# Patient Record
Sex: FEMALE | Race: WHITE | Employment: FULL TIME
[De-identification: names, ages, dates, MRNs, and addresses within clinical notes are randomized per-mention and may not be internally consistent; named-entity substitution may affect disease eponyms.]

---

## 2017-01-04 ENCOUNTER — OFFICE VISIT (OUTPATIENT)
Dept: BARIATRICS/WEIGHT MGMT | Facility: CLINIC | Age: 42
End: 2017-01-04

## 2017-01-04 VITALS
WEIGHT: 232 LBS | BODY MASS INDEX: 36.41 KG/M2 | HEIGHT: 67 IN | SYSTOLIC BLOOD PRESSURE: 110 MMHG | DIASTOLIC BLOOD PRESSURE: 78 MMHG | HEART RATE: 78 BPM

## 2017-01-04 DIAGNOSIS — E66.01 MORBID OBESITY, UNSPECIFIED OBESITY TYPE (HCC): ICD-10-CM

## 2017-01-04 DIAGNOSIS — Z98.84 S/P LAPAROSCOPIC SLEEVE GASTRECTOMY: ICD-10-CM

## 2017-01-04 DIAGNOSIS — E55.9 VITAMIN D DEFICIENCY: ICD-10-CM

## 2017-01-04 DIAGNOSIS — E78.2 MIXED HYPERLIPIDEMIA: ICD-10-CM

## 2017-01-04 DIAGNOSIS — R10.11 RUQ PAIN: Primary | ICD-10-CM

## 2017-01-04 PROCEDURE — 99213 OFFICE O/P EST LOW 20 MIN: CPT | Performed by: SURGERY

## 2017-01-04 RX ORDER — OMEPRAZOLE 40 MG/1
40 CAPSULE, DELAYED RELEASE ORAL DAILY
Qty: 30 CAPSULE | Refills: 3 | Status: SHIPPED | OUTPATIENT
Start: 2017-01-04

## 2017-01-10 ENCOUNTER — NURSE ONLY (OUTPATIENT)
Dept: BARIATRICS/WEIGHT MGMT | Facility: CLINIC | Age: 42
End: 2017-01-10

## 2017-01-10 VITALS — BODY MASS INDEX: 36.49 KG/M2 | WEIGHT: 233 LBS

## 2017-01-10 DIAGNOSIS — Z98.84 S/P LAPAROSCOPIC SLEEVE GASTRECTOMY: Primary | ICD-10-CM

## 2017-03-27 ENCOUNTER — TELEPHONE (OUTPATIENT)
Dept: BARIATRICS/WEIGHT MGMT | Age: 42
End: 2017-03-27

## 2017-04-12 ENCOUNTER — EMPLOYEE WELLNESS (OUTPATIENT)
Dept: OTHER | Age: 42
End: 2017-04-12

## 2017-04-12 LAB
CHOLESTEROL/HDL RATIO: 4.8
CHOLESTEROL: 221 MG/DL
GLUCOSE BLD-MCNC: 92 MG/DL (ref 70–99)
HDLC SERPL-MCNC: 46 MG/DL
LDL CHOLESTEROL: 151 MG/DL (ref 0–130)
PATIENT FASTING?: YES
TRIGL SERPL-MCNC: 118 MG/DL
VLDLC SERPL CALC-MCNC: ABNORMAL MG/DL (ref 1–30)

## 2017-06-27 ENCOUNTER — HOSPITAL ENCOUNTER (OUTPATIENT)
Age: 42
Setting detail: SPECIMEN
Discharge: HOME | End: 2017-06-27
Payer: COMMERCIAL

## 2017-06-27 ENCOUNTER — OFFICE VISIT (OUTPATIENT)
Dept: OBGYN CLINIC | Age: 42
End: 2017-06-27
Payer: COMMERCIAL

## 2017-06-27 VITALS
HEIGHT: 67 IN | BODY MASS INDEX: 36.1 KG/M2 | WEIGHT: 230 LBS | HEART RATE: 70 BPM | RESPIRATION RATE: 16 BRPM | DIASTOLIC BLOOD PRESSURE: 80 MMHG | OXYGEN SATURATION: 100 % | SYSTOLIC BLOOD PRESSURE: 123 MMHG

## 2017-06-27 DIAGNOSIS — N64.4 MASTODYNIA OF LEFT BREAST: ICD-10-CM

## 2017-06-27 DIAGNOSIS — Z01.419 ENCOUNTER FOR ROUTINE GYNECOLOGICAL EXAMINATION WITH PAPANICOLAOU SMEAR OF CERVIX: Primary | ICD-10-CM

## 2017-06-27 DIAGNOSIS — N64.4 BREAST PAIN: ICD-10-CM

## 2017-06-27 PROCEDURE — 99395 PREV VISIT EST AGE 18-39: CPT | Performed by: OBSTETRICS & GYNECOLOGY

## 2017-06-29 PROBLEM — N64.4 MASTODYNIA OF LEFT BREAST: Status: ACTIVE | Noted: 2017-06-29

## 2017-06-29 LAB — CYTOLOGY REPORT: NORMAL

## 2017-07-17 ENCOUNTER — HOSPITAL ENCOUNTER (OUTPATIENT)
Dept: ULTRASOUND IMAGING | Age: 42
Discharge: HOME | End: 2017-07-17
Payer: COMMERCIAL

## 2017-07-17 ENCOUNTER — HOSPITAL ENCOUNTER (OUTPATIENT)
Dept: MAMMOGRAPHY | Age: 42
Discharge: HOME | End: 2017-07-17
Payer: COMMERCIAL

## 2017-07-17 ENCOUNTER — HOSPITAL ENCOUNTER (OUTPATIENT)
Age: 42
Discharge: HOME | End: 2017-07-17
Payer: COMMERCIAL

## 2017-07-17 DIAGNOSIS — N64.4 BREAST PAIN, LEFT: Primary | ICD-10-CM

## 2017-07-17 DIAGNOSIS — N64.4 BREAST PAIN: ICD-10-CM

## 2017-07-17 DIAGNOSIS — N64.4 PAIN OF LEFT BREAST: ICD-10-CM

## 2017-07-17 DIAGNOSIS — N64.4 BREAST PAIN, LEFT: ICD-10-CM

## 2017-07-17 LAB
ABSOLUTE EOS #: 0.2 K/UL (ref 0–0.4)
ABSOLUTE LYMPH #: 1.7 K/UL (ref 1–4.8)
ABSOLUTE MONO #: 0.4 K/UL (ref 0.2–0.8)
ALBUMIN SERPL-MCNC: 4.3 G/DL (ref 3.5–5.2)
ALBUMIN/GLOBULIN RATIO: ABNORMAL (ref 1–2.5)
ALP BLD-CCNC: 50 U/L (ref 35–104)
ALT SERPL-CCNC: 29 U/L (ref 5–33)
ANION GAP SERPL CALCULATED.3IONS-SCNC: 10 MMOL/L (ref 9–17)
AST SERPL-CCNC: 22 U/L
BASOPHILS # BLD: 0 %
BASOPHILS ABSOLUTE: 0 K/UL (ref 0–0.2)
BILIRUB SERPL-MCNC: 0.38 MG/DL (ref 0.3–1.2)
BILIRUBIN URINE: NEGATIVE
BUN BLDV-MCNC: 12 MG/DL (ref 6–20)
BUN/CREAT BLD: 21 (ref 9–20)
C-REACTIVE PROTEIN: 4.2 MG/L (ref 0–5)
CALCIUM SERPL-MCNC: 9 MG/DL (ref 8.6–10.4)
CHLORIDE BLD-SCNC: 103 MMOL/L (ref 98–107)
CHOLESTEROL/HDL RATIO: 5
CHOLESTEROL: 229 MG/DL
CO2: 27 MMOL/L (ref 20–31)
COLOR: YELLOW
COMMENT UA: NORMAL
CORTISOL COLLECTION INFO: NORMAL
CORTISOL: 4.2 UG/DL
CREAT SERPL-MCNC: 0.57 MG/DL (ref 0.5–0.9)
DIFFERENTIAL TYPE: ABNORMAL
EOSINOPHILS RELATIVE PERCENT: 3 %
GFR AFRICAN AMERICAN: >60 ML/MIN
GFR NON-AFRICAN AMERICAN: >60 ML/MIN
GFR SERPL CREATININE-BSD FRML MDRD: ABNORMAL ML/MIN/{1.73_M2}
GFR SERPL CREATININE-BSD FRML MDRD: ABNORMAL ML/MIN/{1.73_M2}
GLUCOSE BLD-MCNC: 90 MG/DL (ref 70–99)
GLUCOSE URINE: NEGATIVE
HCT VFR BLD CALC: 38.4 % (ref 36–46)
HDLC SERPL-MCNC: 46 MG/DL
HEMOGLOBIN: 12.8 G/DL (ref 12–16)
KETONES, URINE: NEGATIVE
LDL CHOLESTEROL: 140 MG/DL (ref 0–130)
LEUKOCYTE ESTERASE, URINE: NEGATIVE
LYMPHOCYTES # BLD: 23 %
MCH RBC QN AUTO: 29 PG (ref 26–34)
MCHC RBC AUTO-ENTMCNC: 33.4 G/DL (ref 31–37)
MCV RBC AUTO: 86.8 FL (ref 80–100)
MONOCYTES # BLD: 5 %
NITRITE, URINE: NEGATIVE
PDW BLD-RTO: 14.7 % (ref 11.5–14.5)
PH UA: 6 (ref 5–8)
PLATELET # BLD: 237 K/UL (ref 130–400)
PLATELET ESTIMATE: ABNORMAL
PMV BLD AUTO: 9.8 FL (ref 6–12)
POTASSIUM SERPL-SCNC: 4.1 MMOL/L (ref 3.7–5.3)
PROTEIN UA: NEGATIVE
RBC # BLD: 4.42 M/UL (ref 4–5.2)
RBC # BLD: ABNORMAL 10*6/UL
RHEUMATOID FACTOR: <10 IU/ML
SEDIMENTATION RATE, ERYTHROCYTE: 10 MM (ref 0–20)
SEG NEUTROPHILS: 69 %
SEGMENTED NEUTROPHILS ABSOLUTE COUNT: 5 K/UL (ref 1.8–7.7)
SODIUM BLD-SCNC: 140 MMOL/L (ref 135–144)
SPECIFIC GRAVITY UA: 1.01 (ref 1–1.03)
THYROXINE, FREE: 0.91 NG/DL (ref 0.93–1.7)
TOTAL PROTEIN: 7.2 G/DL (ref 6.4–8.3)
TRIGL SERPL-MCNC: 213 MG/DL
TSH SERPL DL<=0.05 MIU/L-ACNC: 2.1 MIU/L (ref 0.3–5)
TURBIDITY: CLEAR
URIC ACID: 4.2 MG/DL (ref 2.4–5.7)
URINE HGB: NEGATIVE
UROBILINOGEN, URINE: NORMAL
VLDLC SERPL CALC-MCNC: ABNORMAL MG/DL (ref 1–30)
WBC # BLD: 7.4 K/UL (ref 3.5–11)
WBC # BLD: ABNORMAL 10*3/UL

## 2017-07-17 PROCEDURE — 82533 TOTAL CORTISOL: CPT

## 2017-07-17 PROCEDURE — 81003 URINALYSIS AUTO W/O SCOPE: CPT

## 2017-07-17 PROCEDURE — 36415 COLL VENOUS BLD VENIPUNCTURE: CPT

## 2017-07-17 PROCEDURE — 76642 ULTRASOUND BREAST LIMITED: CPT

## 2017-07-17 PROCEDURE — 84443 ASSAY THYROID STIM HORMONE: CPT

## 2017-07-17 PROCEDURE — 84439 ASSAY OF FREE THYROXINE: CPT

## 2017-07-17 PROCEDURE — 85025 COMPLETE CBC W/AUTO DIFF WBC: CPT

## 2017-07-17 PROCEDURE — 86431 RHEUMATOID FACTOR QUANT: CPT

## 2017-07-17 PROCEDURE — 85651 RBC SED RATE NONAUTOMATED: CPT

## 2017-07-17 PROCEDURE — 86038 ANTINUCLEAR ANTIBODIES: CPT

## 2017-07-17 PROCEDURE — 80053 COMPREHEN METABOLIC PANEL: CPT

## 2017-07-17 PROCEDURE — 84550 ASSAY OF BLOOD/URIC ACID: CPT

## 2017-07-17 PROCEDURE — G0279 TOMOSYNTHESIS, MAMMO: HCPCS

## 2017-07-17 PROCEDURE — 86140 C-REACTIVE PROTEIN: CPT

## 2017-07-17 PROCEDURE — 80061 LIPID PANEL: CPT

## 2017-07-18 LAB — ANTI-NUCLEAR ANTIBODY (ANA): NEGATIVE

## 2018-03-20 VITALS — BODY MASS INDEX: 36.02 KG/M2 | WEIGHT: 230 LBS

## 2018-05-08 ENCOUNTER — TELEPHONE (OUTPATIENT)
Dept: BARIATRICS/WEIGHT MGMT | Age: 43
End: 2018-05-08

## 2018-06-08 ENCOUNTER — HOSPITAL ENCOUNTER (OUTPATIENT)
Age: 43
Discharge: HOME | End: 2018-06-08
Payer: COMMERCIAL

## 2018-06-08 ENCOUNTER — HOSPITAL ENCOUNTER (OUTPATIENT)
Dept: MRI IMAGING | Age: 43
Discharge: HOME | End: 2018-06-10
Payer: COMMERCIAL

## 2018-06-08 DIAGNOSIS — R40.4 TRANSIENT ALTERATION OF AWARENESS: ICD-10-CM

## 2018-06-08 LAB
FOLATE: 14.3 NG/ML
VITAMIN D 25-HYDROXY: 18.1 NG/ML (ref 30–100)

## 2018-06-08 PROCEDURE — 36415 COLL VENOUS BLD VENIPUNCTURE: CPT

## 2018-06-08 PROCEDURE — 82746 ASSAY OF FOLIC ACID SERUM: CPT

## 2018-06-08 PROCEDURE — 82306 VITAMIN D 25 HYDROXY: CPT

## 2018-06-11 ENCOUNTER — HOSPITAL ENCOUNTER (OUTPATIENT)
Dept: MRI IMAGING | Age: 43
Discharge: HOME | End: 2018-06-13
Payer: COMMERCIAL

## 2018-06-11 DIAGNOSIS — R40.4 TRANSIENT ALTERATION OF AWARENESS: ICD-10-CM

## 2018-06-11 PROCEDURE — 6360000004 HC RX CONTRAST MEDICATION: Performed by: PHYSICIAN ASSISTANT

## 2018-06-11 PROCEDURE — A9579 GAD-BASE MR CONTRAST NOS,1ML: HCPCS | Performed by: PHYSICIAN ASSISTANT

## 2018-06-11 PROCEDURE — 70553 MRI BRAIN STEM W/O & W/DYE: CPT

## 2018-06-11 RX ADMIN — GADOTERIDOL 20 ML: 279.3 INJECTION, SOLUTION INTRAVENOUS at 09:55

## 2019-01-28 ENCOUNTER — HOSPITAL ENCOUNTER (OUTPATIENT)
Age: 44
Setting detail: SPECIMEN
Discharge: HOME | End: 2019-01-28
Payer: COMMERCIAL

## 2019-01-28 ENCOUNTER — OFFICE VISIT (OUTPATIENT)
Dept: OBGYN CLINIC | Age: 44
End: 2019-01-28
Payer: COMMERCIAL

## 2019-01-28 VITALS
DIASTOLIC BLOOD PRESSURE: 81 MMHG | HEIGHT: 67 IN | WEIGHT: 236.6 LBS | BODY MASS INDEX: 37.13 KG/M2 | SYSTOLIC BLOOD PRESSURE: 123 MMHG | HEART RATE: 70 BPM

## 2019-01-28 DIAGNOSIS — Z12.39 BREAST CANCER SCREENING: ICD-10-CM

## 2019-01-28 DIAGNOSIS — Z01.419 WELL WOMAN EXAM WITH ROUTINE GYNECOLOGICAL EXAM: Primary | ICD-10-CM

## 2019-01-28 DIAGNOSIS — N93.9 ABNORMAL UTERINE BLEEDING (AUB): ICD-10-CM

## 2019-01-28 PROCEDURE — G8484 FLU IMMUNIZE NO ADMIN: HCPCS | Performed by: OBSTETRICS & GYNECOLOGY

## 2019-01-28 PROCEDURE — 99396 PREV VISIT EST AGE 40-64: CPT | Performed by: OBSTETRICS & GYNECOLOGY

## 2019-01-28 RX ORDER — MULTIVIT-MIN/IRON/FOLIC ACID/K 18-600-40
CAPSULE ORAL
COMMUNITY

## 2019-01-28 RX ORDER — METHYLPHENIDATE HYDROCHLORIDE 54 MG/1
54 TABLET ORAL EVERY MORNING
COMMUNITY

## 2019-02-11 LAB — CYTOLOGY REPORT: NORMAL

## 2019-05-22 ENCOUNTER — TELEPHONE (OUTPATIENT)
Dept: BARIATRICS/WEIGHT MGMT | Age: 44
End: 2019-05-22

## 2019-05-22 NOTE — TELEPHONE ENCOUNTER
Patient due for annual post op visit. Spoke with patient she was at work . Stated she will call us back to schedule follow up .

## 2020-03-09 ENCOUNTER — TELEPHONE (OUTPATIENT)
Dept: BARIATRICS/WEIGHT MGMT | Age: 45
End: 2020-03-09

## 2020-10-19 ENCOUNTER — HOSPITAL ENCOUNTER (EMERGENCY)
Age: 45
Discharge: HOME | End: 2020-10-19
Attending: EMERGENCY MEDICINE
Payer: COMMERCIAL

## 2020-10-19 ENCOUNTER — APPOINTMENT (OUTPATIENT)
Dept: CT IMAGING | Age: 45
End: 2020-10-19
Payer: COMMERCIAL

## 2020-10-19 ENCOUNTER — APPOINTMENT (OUTPATIENT)
Dept: GENERAL RADIOLOGY | Age: 45
End: 2020-10-19
Payer: COMMERCIAL

## 2020-10-19 VITALS
RESPIRATION RATE: 16 BRPM | OXYGEN SATURATION: 100 % | TEMPERATURE: 99.5 F | BODY MASS INDEX: 31.39 KG/M2 | WEIGHT: 200 LBS | DIASTOLIC BLOOD PRESSURE: 95 MMHG | SYSTOLIC BLOOD PRESSURE: 137 MMHG | HEART RATE: 68 BPM | HEIGHT: 67 IN

## 2020-10-19 LAB
ABSOLUTE EOS #: 0.11 K/UL (ref 0–0.44)
ABSOLUTE IMMATURE GRANULOCYTE: 0.01 K/UL (ref 0–0.3)
ABSOLUTE LYMPH #: 1.81 K/UL (ref 1.1–3.7)
ABSOLUTE MONO #: 0.37 K/UL (ref 0.1–1.2)
ANION GAP SERPL CALCULATED.3IONS-SCNC: 12 MMOL/L (ref 9–17)
BASOPHILS # BLD: 1 % (ref 0–2)
BASOPHILS ABSOLUTE: 0.04 K/UL (ref 0–0.2)
BUN BLDV-MCNC: 13 MG/DL (ref 6–20)
BUN/CREAT BLD: 19 (ref 9–20)
C-REACTIVE PROTEIN: 2.4 MG/L (ref 0–5)
CALCIUM SERPL-MCNC: 9.5 MG/DL (ref 8.6–10.4)
CHLORIDE BLD-SCNC: 104 MMOL/L (ref 98–107)
CO2: 23 MMOL/L (ref 20–31)
CREAT SERPL-MCNC: 0.68 MG/DL (ref 0.5–0.9)
DIFFERENTIAL TYPE: NORMAL
EOSINOPHILS RELATIVE PERCENT: 2 % (ref 1–4)
GFR AFRICAN AMERICAN: >60 ML/MIN
GFR NON-AFRICAN AMERICAN: >60 ML/MIN
GFR SERPL CREATININE-BSD FRML MDRD: NORMAL ML/MIN/{1.73_M2}
GFR SERPL CREATININE-BSD FRML MDRD: NORMAL ML/MIN/{1.73_M2}
GLUCOSE BLD-MCNC: 98 MG/DL (ref 70–99)
HCT VFR BLD CALC: 40.5 % (ref 36.3–47.1)
HEMOGLOBIN: 13.1 G/DL (ref 11.9–15.1)
IMMATURE GRANULOCYTES: 0 %
INR BLD: 1
LV EF: 55 %
LVEF MODALITY: NORMAL
LYMPHOCYTES # BLD: 29 % (ref 24–43)
MCH RBC QN AUTO: 29.4 PG (ref 25.2–33.5)
MCHC RBC AUTO-ENTMCNC: 32.3 G/DL (ref 28.4–34.8)
MCV RBC AUTO: 90.8 FL (ref 82.6–102.9)
MONOCYTES # BLD: 6 % (ref 3–12)
NRBC AUTOMATED: 0 PER 100 WBC
PARTIAL THROMBOPLASTIN TIME: 32.7 SEC (ref 23.9–33.8)
PDW BLD-RTO: 13.1 % (ref 11.8–14.4)
PLATELET # BLD: 218 K/UL (ref 138–453)
PLATELET ESTIMATE: NORMAL
PMV BLD AUTO: 11.6 FL (ref 8.1–13.5)
POTASSIUM SERPL-SCNC: 3.9 MMOL/L (ref 3.7–5.3)
PROTHROMBIN TIME: 12.8 SEC (ref 11.5–14.2)
RBC # BLD: 4.46 M/UL (ref 3.95–5.11)
RBC # BLD: NORMAL 10*6/UL
SEDIMENTATION RATE, ERYTHROCYTE: 5 MM (ref 0–20)
SEG NEUTROPHILS: 62 % (ref 36–65)
SEGMENTED NEUTROPHILS ABSOLUTE COUNT: 3.91 K/UL (ref 1.5–8.1)
SODIUM BLD-SCNC: 139 MMOL/L (ref 135–144)
TROPONIN INTERP: NORMAL
TROPONIN T: NORMAL NG/ML
TROPONIN, HIGH SENSITIVITY: <6 NG/L (ref 0–14)
WBC # BLD: 6.3 K/UL (ref 3.5–11.3)
WBC # BLD: NORMAL 10*3/UL

## 2020-10-19 PROCEDURE — 6370000000 HC RX 637 (ALT 250 FOR IP): Performed by: EMERGENCY MEDICINE

## 2020-10-19 PROCEDURE — 2580000003 HC RX 258: Performed by: EMERGENCY MEDICINE

## 2020-10-19 PROCEDURE — 6360000004 HC RX CONTRAST MEDICATION: Performed by: EMERGENCY MEDICINE

## 2020-10-19 PROCEDURE — 71260 CT THORAX DX C+: CPT

## 2020-10-19 PROCEDURE — 99284 EMERGENCY DEPT VISIT MOD MDM: CPT

## 2020-10-19 PROCEDURE — 93306 TTE W/DOPPLER COMPLETE: CPT

## 2020-10-19 PROCEDURE — 93005 ELECTROCARDIOGRAM TRACING: CPT | Performed by: EMERGENCY MEDICINE

## 2020-10-19 PROCEDURE — 71045 X-RAY EXAM CHEST 1 VIEW: CPT

## 2020-10-19 PROCEDURE — 85610 PROTHROMBIN TIME: CPT

## 2020-10-19 PROCEDURE — 99283 EMERGENCY DEPT VISIT LOW MDM: CPT

## 2020-10-19 PROCEDURE — 80048 BASIC METABOLIC PNL TOTAL CA: CPT

## 2020-10-19 PROCEDURE — 85025 COMPLETE CBC W/AUTO DIFF WBC: CPT

## 2020-10-19 PROCEDURE — 86140 C-REACTIVE PROTEIN: CPT

## 2020-10-19 PROCEDURE — 85730 THROMBOPLASTIN TIME PARTIAL: CPT

## 2020-10-19 PROCEDURE — 85652 RBC SED RATE AUTOMATED: CPT

## 2020-10-19 PROCEDURE — 84484 ASSAY OF TROPONIN QUANT: CPT

## 2020-10-19 RX ORDER — ACETAMINOPHEN AND CODEINE PHOSPHATE 300; 30 MG/1; MG/1
1 TABLET ORAL EVERY 4 HOURS PRN
Qty: 10 TABLET | Refills: 0 | Status: SHIPPED | OUTPATIENT
Start: 2020-10-19 | End: 2020-10-26

## 2020-10-19 RX ORDER — OXYCODONE HYDROCHLORIDE AND ACETAMINOPHEN 5; 325 MG/1; MG/1
1 TABLET ORAL ONCE
Status: COMPLETED | OUTPATIENT
Start: 2020-10-19 | End: 2020-10-19

## 2020-10-19 RX ORDER — SODIUM CHLORIDE 0.9 % (FLUSH) 0.9 %
10 SYRINGE (ML) INJECTION PRN
Status: DISCONTINUED | OUTPATIENT
Start: 2020-10-19 | End: 2020-10-19 | Stop reason: HOSPADM

## 2020-10-19 RX ORDER — 0.9 % SODIUM CHLORIDE 0.9 %
80 INTRAVENOUS SOLUTION INTRAVENOUS ONCE
Status: COMPLETED | OUTPATIENT
Start: 2020-10-19 | End: 2020-10-19

## 2020-10-19 RX ADMIN — SODIUM CHLORIDE 80 ML: 9 INJECTION, SOLUTION INTRAVENOUS at 19:33

## 2020-10-19 RX ADMIN — Medication 10 ML: at 19:33

## 2020-10-19 RX ADMIN — OXYCODONE HYDROCHLORIDE AND ACETAMINOPHEN 1 TABLET: 5; 325 TABLET ORAL at 17:06

## 2020-10-19 RX ADMIN — IOPAMIDOL 75 ML: 755 INJECTION, SOLUTION INTRAVENOUS at 19:33

## 2020-10-19 ASSESSMENT — ENCOUNTER SYMPTOMS
SHORTNESS OF BREATH: 1
EYE DISCHARGE: 0
EYE PAIN: 0
CHEST TIGHTNESS: 0
FACIAL SWELLING: 0
ABDOMINAL DISTENTION: 0
BACK PAIN: 0
ABDOMINAL PAIN: 0

## 2020-10-19 ASSESSMENT — PAIN DESCRIPTION - ORIENTATION: ORIENTATION: LEFT

## 2020-10-19 ASSESSMENT — PAIN DESCRIPTION - DESCRIPTORS: DESCRIPTORS: RADIATING

## 2020-10-19 ASSESSMENT — PAIN DESCRIPTION - PAIN TYPE: TYPE: ACUTE PAIN

## 2020-10-19 ASSESSMENT — PAIN DESCRIPTION - LOCATION: LOCATION: CHEST

## 2020-10-19 ASSESSMENT — PAIN SCALES - GENERAL
PAINLEVEL_OUTOF10: 7
PAINLEVEL_OUTOF10: 6

## 2020-10-19 ASSESSMENT — PAIN DESCRIPTION - FREQUENCY: FREQUENCY: CONTINUOUS

## 2020-10-19 NOTE — ED PROVIDER NOTES
EMERGENCY DEPARTMENT ENCOUNTER    Pt Name: Balwinder Munoz  MRN: [id number]  [place] 1975  Date of evaluation: 10/19/20  CHIEF COMPLAINT       Chief Complaint   Patient presents with    Chest Pain     has large pericardial effusion per pt she is under care of [address] [place] [address] cardiology for     Shortness of Breath     with pain     HISTORY OF PRESENT ILLNESS   HPI   The patient is a 47-year-old female with a history of recurrent pericardial effusions who presented to the emergency department secondary to chest pain or shortness of breath. Patient said over last 1 week she had increased shortness of breath as well as chest pain increases with inspiration. Patient stated [facility] by Dr. Abdirahman Heard at that time discussion of possible cardiocentesis however that time was deferred. She is not currently on steroids but is on colchicine will be reevaluated. Patient denies hemoptysis. Denies recent travel or immobility. She is not on a diuretic. She cannot take NSAIDs secondary to gastric ulcers. REVIEW OF SYSTEMS     Review of Systems   Constitutional: Negative for chills, diaphoresis and fever. HENT: Negative for congestion, ear pain and facial swelling. Eyes: Negative for pain, discharge and visual disturbance. Respiratory: Positive for shortness of breath. Negative for chest tightness. Cardiovascular: Positive for chest pain. Negative for palpitations. Gastrointestinal: Negative for abdominal distention and abdominal pain. Genitourinary: Negative for difficulty urinating and flank pain. Musculoskeletal: Negative for back pain. Skin: Negative for wound. Neurological: Negative for dizziness, light-headedness and headaches.      PASTMEDICAL HISTORY     Past Medical History:   Diagnosis Date    Cataracts, bilateral     STARTING     HX OTHER MEDICAL     dvt/pe neg    Hyperlipidemia     Knee dislocation     CHRISTIANO AS A CHILD- WAS BRACED WAS 8 MONTHS    Obesity  Osteoarthritis     S/P laparoscopic sleeve gastrectomy 14    wt = 250lb    Stress fracture     rt foot currently    Tendonitis     HX RIGHT ARM    Wears glasses     Wears glasses      SURGICAL HISTORY       Past Surgical History:   Procedure Laterality Date    ABDOMINOPLASTY      ANKLE FRACTURE SURGERY      LEFT HAD EXTERNAL FIXATOR    ANKLE SURGERY      RIGHT TORN LIGAMENTS     SECTION  ,     CYST REMOVAL N/A 14    MULTIPLE SCALP LESIONS    GASTRIC BYPASS SURGERY  14    gastric sleeve    TONSILLECTOMY AND ADENOIDECTOMY      TUBAL LIGATION      UPPER GASTROINTESTINAL ENDOSCOPY      STIATIONS DEVELOPING     CURRENT MEDICATIONS       Discharge Medication List as of 10/19/2020  8:03 PM      CONTINUE these medications which have NOT CHANGED    Details   methylphenidate (CONCERTA) 54 MG extended release tablet Take 54 mg by mouth every morning. [place] [facility] Historical Med      Cholecalciferol (VITAMIN D) 2000 units CAPS capsule Take by mouthHistorical Med      omeprazole (PRILOSEC) 40 MG delayed release capsule Take 1 capsule by mouth daily, Disp-30 capsule, R-3      Multiple Vitamin (MVI, BARIATRIC ADVANTAGE MULTI-FORMULA, CHEW TAB) Take 1 tablet by mouth Indications: OTC chewable [employer] BA capsules with iron, 3 caps daily      CALCIUM CITRATE, BARIATRIC ADVANTAGE, 500MG LOZENGE Take 1 lozenge by mouth 3 times daily Indications: OTC Chewable       ValACYclovir HCl (VALTREX PO) Take  by mouth as needed. ALLERGIES     has No Known Allergies. FAMILY HISTORY     She indicated that the status of her mother is unknown. She indicated that the status of her maternal grandmother is unknown. She indicated that the status of her paternal grandfather is unknown. She indicated that the status of her maternal aunt is unknown.      SOCIAL HISTORY       Social History     Tobacco Use    Smoking status: Former Smoker     Packs/day: 0.00     Last attempt to quit: 2014     Years since quittin.7    Smokeless tobacco: Never Used    Tobacco comment: QUIT 2014   Substance Use Topics    Alcohol use: Yes     Comment: occassional ,willing to avoid for at least 6 mon post shane surg    Drug use: No     PHYSICAL EXAM     INITIAL VITALS: BP (!) 137/95   Pulse 68   Temp 99.5 °F (37.5 °C) (Oral)   Resp 16   Ht 5' 7\" (1.702 m)   Wt 200 lb (90.7 kg)   SpO2 100%   BMI 31.32 kg/m²    Physical Exam  Vitals signs and nursing note reviewed. Constitutional:       General: She is not in acute distress. Appearance: She is well-developed. She is not diaphoretic. HENT:      Head: Normocephalic and atraumatic. Eyes:      Pupils: Pupils are equal, round, and reactive to light. Neck:      Musculoskeletal: Normal range of motion and neck supple. Cardiovascular:      Rate and Rhythm: Normal rate and regular rhythm. Pulmonary:      Effort: Pulmonary effort is normal.      Breath sounds: Normal breath sounds. Abdominal:      General: Bowel sounds are normal.      Palpations: Abdomen is soft. Musculoskeletal: Normal range of motion. Skin:     General: Skin is warm. Capillary Refill: Capillary refill takes less than 2 seconds. Neurological:      Mental Status: She is alert and oriented to person, place, and time. MEDICAL DECISION MAKING:   The patient was seen and examined. The patient is a 62-year-old female who presented to the emergency department secondary to chest pain or shortness of breath. Given patient's history a unofficial bedside ultrasound performed by myself revealed moderate pericardial effusion, no tamponade. Orders for stat echo chest x-ray other ancillary testing. Patient has multiple drug sensitivities she did receive Percocet. Patient will be reevaluated and her primary care doctor will be contacted.   CT chest pending at this time, patient will be endorsed to Dr. Lamonte Galloway please see his note for final ED disposition    ED Course as of Oct 3656 [facility] Oct 19, 2020   8439 Discussed with Dr. Joce Hernandez for cardiology, he did review echocardiogram noting moderate pericardial effusion no tamponade mild, if CT is stable recommend discharged home with CCA follow-up for possible drain or other procedure.    [AS]      ED Course User Index  [AS] Deyvi Wolf MD       All patient's question's and concerns were answered prior to disposition and patient and/or family expressed understanding and agreement of treatment plan. CRITICAL CARE:              NIH STROKE SCALE:            PROCEDURES:  Bedside cardiac ultrasound  Indication: Shortness of breath, history of pericardial effusion  Verbal consent obtained. The patient was prepped and draped in sterile fashion. A focused ultrasound exam of the heart was performed to evaluate for no tamponade, tamponade, severe hypobulimia no gross abnormalities of cardiac anatomy or function in this patient. The pericardial sac, myocardium and 4 chambers were identified using the following views subxiphoid, parasternal long axis, parasternal short, axis and apical four-chamber. Findings revealed moderate  pericardial effusion no tamponade. Patient tolerated well with no complications. A formal echo was also obtained with similar findings. Procedures    DIAGNOSTIC RESULTS   EKG:All EKG's are interpreted by the Emergency Department Physician who either signs or Co-signs this chart in the absence of a cardiologist.        RADIOLOGY:All plain film, CT, MRI, and formal ultrasound images (except ED bedside ultrasound) are read by the radiologist, see reports below, unless otherwisenoted in MDM or here. CT CHEST PULMONARY EMBOLISM W CONTRAST   Final Result   1. No evidence of pulmonary embolism   2. Moderate to large pericardial effusion   3. No acute pulmonary abnormality   4. Left adrenal adenoma   5. Cholelithiasis   6. XR CHEST PORTABLE   Final Result   No acute cardiopulmonary process.            LABS: All Caryn Linn MD  34/49/46 [address] [facility] [address], [place]  50/07/36 1409

## 2020-10-19 NOTE — ED PROVIDER NOTES
EMERGENCY DEPARTMENT ENCOUNTER    Pt Name: Ragini Chavez  MRN: [id number]  [place] 1975  Date of evaluation: 10/19/20  CHIEF COMPLAINT       Chief Complaint   Patient presents with    Chest Pain     has large pericardial effusion per pt she is under care of [address] [place] [address] cardiology for     Shortness of Breath     with pain     HISTORY OF PRESENT ILLNESS   HPI   The patient is a 57-year-old female with a history of recurrent pericardial effusions who presented to the emerge from secondary to chest pain or shortness of breath. Patient said over last 1 week she had increased shortness of breath as well as chest pain increases with inspiration. Patient stated she was recently seen at [facility] clinic by Dr. María Rothman at that time discussion of possible cardiocentesis however that time was deferred. She is not currently on steroids but is on colchicine will be reevaluated. Patient denies hemoptysis. Denies recent travel or immobility. She is not on a diuretic. She cannot take NSAIDs secondary to gastric ulcers. REVIEW OF SYSTEMS     Review of Systems   Constitutional: Negative for chills, diaphoresis and fever. HENT: Negative for congestion, ear pain and facial swelling. Eyes: Negative for pain, discharge and visual disturbance. Respiratory: Positive for shortness of breath. Negative for chest tightness. Cardiovascular: Positive for chest pain. Negative for palpitations. Gastrointestinal: Negative for abdominal distention and abdominal pain. Genitourinary: Negative for difficulty urinating and flank pain. Musculoskeletal: Negative for back pain. Skin: Negative for wound. Neurological: Negative for dizziness, light-headedness and headaches.      PASTMEDICAL HISTORY     Past Medical History:   Diagnosis Date    Cataracts, bilateral     STARTING     HX OTHER MEDICAL     dvt/pe neg    Hyperlipidemia     Knee dislocation     CHRISTIANO AS A CHILD- WAS BRACED WAS 8 MONTHS    Obesity     Osteoarthritis     S/P laparoscopic sleeve gastrectomy 14    wt = 250lb    Stress fracture     rt foot currently    Tendonitis     HX RIGHT ARM    Wears glasses     Wears glasses      SURGICAL HISTORY       Past Surgical History:   Procedure Laterality Date    ABDOMINOPLASTY      ANKLE FRACTURE SURGERY      LEFT HAD EXTERNAL FIXATOR    ANKLE SURGERY      RIGHT TORN LIGAMENTS     SECTION  ,     CYST REMOVAL N/A 14    MULTIPLE SCALP LESIONS    GASTRIC BYPASS SURGERY  14    gastric sleeve    TONSILLECTOMY AND ADENOIDECTOMY      TUBAL LIGATION      UPPER GASTROINTESTINAL ENDOSCOPY      STIATIONS DEVELOPING     CURRENT MEDICATIONS       Discharge Medication List as of 10/19/2020  8:03 PM      CONTINUE these medications which have NOT CHANGED    Details   methylphenidate (CONCERTA) 54 MG extended release tablet Take 54 mg by mouth every morning. [place] [facility] Historical Med      Cholecalciferol (VITAMIN D) 2000 units CAPS capsule Take by mouthHistorical Med      omeprazole (PRILOSEC) 40 MG delayed release capsule Take 1 capsule by mouth daily, Disp-30 capsule, R-3      Multiple Vitamin (MVI, BARIATRIC ADVANTAGE MULTI-FORMULA, CHEW TAB) Take 1 tablet by mouth Indications: OTC chewable [employer] BA capsules with iron, 3 caps daily      CALCIUM CITRATE, BARIATRIC ADVANTAGE, 500MG LOZENGE Take 1 lozenge by mouth 3 times daily Indications: OTC Chewable       ValACYclovir HCl (VALTREX PO) Take  by mouth as needed. ALLERGIES     has No Known Allergies. FAMILY HISTORY     She indicated that the status of her mother is unknown. She indicated that the status of her maternal grandmother is unknown. She indicated that the status of her paternal grandfather is unknown. She indicated that the status of her maternal aunt is unknown.      SOCIAL HISTORY       Social History     Tobacco Use    Smoking status: Former Smoker     Packs/day: 0.00     Last attempt to quit: 2014     Years since quittin.7    Smokeless tobacco: Never Used    Tobacco comment: QUIT 2014   Substance Use Topics    Alcohol use: Yes     Comment: occassional ,willing to avoid for at least 6 mon post shane surg    Drug use: No     PHYSICAL EXAM     INITIAL VITALS: BP (!) 137/95   Pulse 68   Temp 99.5 °F (37.5 °C) (Oral)   Resp 16   Ht 5' 7\" (1.702 m)   Wt 200 lb (90.7 kg)   SpO2 100%   BMI 31.32 kg/m²    Physical Exam  Vitals signs and nursing note reviewed. Constitutional:       General: She is not in acute distress. Appearance: She is well-developed. She is not diaphoretic. HENT:      Head: Normocephalic and atraumatic. Eyes:      Pupils: Pupils are equal, round, and reactive to light. Neck:      Musculoskeletal: Normal range of motion and neck supple. Cardiovascular:      Rate and Rhythm: Normal rate and regular rhythm. Pulmonary:      Effort: Pulmonary effort is normal.      Breath sounds: Normal breath sounds. Abdominal:      General: Bowel sounds are normal.      Palpations: Abdomen is soft. Musculoskeletal: Normal range of motion. Skin:     General: Skin is warm. Capillary Refill: Capillary refill takes less than 2 seconds. Neurological:      Mental Status: She is alert and oriented to person, place, and time. MEDICAL DECISION MAKING:   The patient was seen and examined. The patient is a 81-year-old female who presented to the emergency department secondary to chest pain or shortness of breath. Given patient's history a unofficial bedside ultrasound performed by myself revealed moderate pericardial effusion, no tamponade. Orders for stat echo chest x-ray other ancillary testing. Patient has multiple drug sensitivities she did receive Percocet. Patient will be reevaluated and her primary care doctor will be contacted.   CT chest pending at this time, patient will be endorsed to Dr. Scott Hughes please see his note for final ED disposition    ED Course as of Oct 22 0802 Mon Oct 19, 2020   1589 Discussed with Dr. Nessa De for cardiology, he did review echocardiogram noting moderate pericardial effusion no tamponade mild, if CT is stable recommend discharged home with CCA follow-up for possible drain or other procedure.    [AS]      ED Course User Index  [AS] Tom Morejon MD       All patient's question's and concerns were answered prior to disposition and patient and/or family expressed understanding and agreement of treatment plan. CRITICAL CARE:              NIH STROKE SCALE:            PROCEDURES:  Bedside cardiac ultrasound  Indication: Shortness of breath, history of pericardial effusion  Verbal consent obtained. The patient was prepped and draped in sterile fashion. A focused ultrasound exam of the heart was performed to evaluate for no tamponade, tamponade, severe hypobulimia no gross abnormalities of cardiac anatomy or function in this patient. The pericardial sac, myocardium and 4 chambers were identified using the following views subxiphoid, parasternal long axis, parasternal short, axis and apical four-chamber. Findings revealed moderate  pericardial effusion no tamponade. Patient tolerated well with no complications. A formal echo was also obtained with similar findings. Procedures    DIAGNOSTIC RESULTS   EKG:All EKG's are interpreted by the Emergency Department Physician who either signs or Co-signs this chart in the absence of a cardiologist.        RADIOLOGY:All plain film, CT, MRI, and formal ultrasound images (except ED bedside ultrasound) are read by the radiologist, see reports below, unless otherwisenoted in MDM or here. CT CHEST PULMONARY EMBOLISM W CONTRAST   Final Result   1. No evidence of pulmonary embolism   2. Moderate to large pericardial effusion   3. No acute pulmonary abnormality   4. Left adrenal adenoma   5. Cholelithiasis   6. XR CHEST PORTABLE   Final Result   No acute cardiopulmonary process.            LABS: All lab results were reviewed by myself, and all abnormals are listed below. Labs Reviewed   CBC WITH AUTO DIFFERENTIAL   BASIC METABOLIC PANEL W/ REFLEX TO MG FOR LOW K   C-REACTIVE PROTEIN   SEDIMENTATION RATE   PROTIME-INR   APTT   TROPONIN       EMERGENCY DEPARTMENTCOURSE:         Vitals:    Vitals:    10/19/20 1824 10/19/20 1830 10/19/20 1836 10/19/20 1842   BP:       Pulse: 63 64 66 68   Resp:       Temp:       TempSrc:       SpO2: 98% 98% 100% 100%   Weight:       Height:           The patient was given the following medications while in the emergency department:  Orders Placed This Encounter   Medications    oxyCODONE-acetaminophen (PERCOCET) 5-325 MG per tablet 1 tablet    0.9 % sodium chloride bolus    DISCONTD: sodium chloride flush 0.9 % injection 10 mL    iopamidol (ISOVUE-370) 76 % injection 75 mL    acetaminophen-codeine (TYLENOL/CODEINE #3) 300-30 MG per tablet     Sig: Take 1 tablet by mouth every 4 hours as needed for Pain for up to 10 doses. Intended supply: 3 days. Take lowest dose possible to manage pain     Dispense:  10 tablet     Refill:  0     CONSULTS:  None    FINAL IMPRESSION      1. Pericardial effusion          DISPOSITION/PLAN   DISPOSITION        PATIENT REFERRED TO:  No follow-up provider specified. DISCHARGE MEDICATIONS:  Discharge Medication List as of 10/19/2020  8:03 PM      START taking these medications    Details   acetaminophen-codeine (TYLENOL/CODEINE #3) 300-30 MG per tablet Take 1 tablet by mouth every 4 hours as needed for Pain for up to 10 doses. Intended supply: 3 days. Take lowest dose possible to manage pain, [id number]           Katie Weston MD  Attending Emergency Physician    This note was created with the assistance of a speech-recognition program. While intending to generate a document that actually reflects the content of the visit, no guarantees can be provided that every mistake has been identified and corrected by editing.

## 2020-10-20 LAB
EKG ATRIAL RATE: 71 BPM
EKG P AXIS: 20 DEGREES
EKG P-R INTERVAL: 162 MS
EKG Q-T INTERVAL: 402 MS
EKG QRS DURATION: 82 MS
EKG QTC CALCULATION (BAZETT): 436 MS
EKG R AXIS: -14 DEGREES
EKG T AXIS: 29 DEGREES
EKG VENTRICULAR RATE: 71 BPM

## 2020-10-20 PROCEDURE — 93010 ELECTROCARDIOGRAM REPORT: CPT | Performed by: INTERNAL MEDICINE

## 2020-10-22 ASSESSMENT — ENCOUNTER SYMPTOMS
ABDOMINAL DISTENTION: 0
SHORTNESS OF BREATH: 1
BACK PAIN: 0
FACIAL SWELLING: 0
ABDOMINAL PAIN: 0
EYE PAIN: 0
CHEST TIGHTNESS: 0
EYE DISCHARGE: 0

## 2021-04-01 ENCOUNTER — TELEPHONE (OUTPATIENT)
Dept: BARIATRICS/WEIGHT MGMT | Age: 46
End: 2021-04-01

## 2021-11-10 ENCOUNTER — HOSPITAL ENCOUNTER (EMERGENCY)
Facility: CLINIC | Age: 46
Discharge: HOME | End: 2021-11-10
Attending: EMERGENCY MEDICINE
Payer: COMMERCIAL

## 2021-11-10 ENCOUNTER — APPOINTMENT (OUTPATIENT)
Dept: GENERAL RADIOLOGY | Facility: CLINIC | Age: 46
End: 2021-11-10
Payer: COMMERCIAL

## 2021-11-10 VITALS
SYSTOLIC BLOOD PRESSURE: 144 MMHG | OXYGEN SATURATION: 98 % | HEIGHT: 67 IN | TEMPERATURE: 98.2 F | RESPIRATION RATE: 16 BRPM | WEIGHT: 225 LBS | DIASTOLIC BLOOD PRESSURE: 88 MMHG | HEART RATE: 73 BPM | BODY MASS INDEX: 35.31 KG/M2

## 2021-11-10 DIAGNOSIS — S92.425A CLOSED NONDISPLACED FRACTURE OF DISTAL PHALANX OF LEFT GREAT TOE, INITIAL ENCOUNTER: Primary | ICD-10-CM

## 2021-11-10 PROCEDURE — 73630 X-RAY EXAM OF FOOT: CPT

## 2021-11-10 PROCEDURE — 99283 EMERGENCY DEPT VISIT LOW MDM: CPT

## 2021-11-10 ASSESSMENT — PAIN DESCRIPTION - PROGRESSION: CLINICAL_PROGRESSION: NOT CHANGED

## 2021-11-10 ASSESSMENT — PAIN DESCRIPTION - ORIENTATION: ORIENTATION: LEFT

## 2021-11-10 ASSESSMENT — PAIN DESCRIPTION - FREQUENCY: FREQUENCY: CONTINUOUS

## 2021-11-10 ASSESSMENT — ENCOUNTER SYMPTOMS
COLOR CHANGE: 1
RESPIRATORY NEGATIVE: 1
EYES NEGATIVE: 1
GASTROINTESTINAL NEGATIVE: 1

## 2021-11-10 ASSESSMENT — PAIN DESCRIPTION - LOCATION: LOCATION: FOOT

## 2021-11-10 ASSESSMENT — PAIN DESCRIPTION - ONSET: ONSET: ON-GOING

## 2021-11-10 ASSESSMENT — PAIN DESCRIPTION - PAIN TYPE: TYPE: ACUTE PAIN

## 2021-11-10 ASSESSMENT — PAIN DESCRIPTION - DESCRIPTORS: DESCRIPTORS: ACHING

## 2021-11-10 ASSESSMENT — PAIN SCALES - GENERAL: PAINLEVEL_OUTOF10: 6

## 2021-11-10 NOTE — ED PROVIDER NOTES
Emergency Department           COMPLAINT       Chief Complaint   Patient presents with    Foot Injury      PHYSICAL EXAM      ED Triage Vitals [11/10/21 1241]   BP Temp Temp Source Pulse Resp SpO2 Height Weight   -- 98.2 °F (36.8 °C) Oral 73 16 98 % 5' 7\" (1.702 m) 225 lb (102.1 kg)         Constitutional: Alert, oriented x3, nontoxic, answering questions appropriately, acting properly for age, in no acute distress   HEENT: Extraocular muscles intact,   Neck: Trachea midline,   Musculoskeletal: Left lower extremity no pain at the knee or ankle. Pedal pulse intact and capillary refill less than 2 seconds. Ecchymosis to the left big toe with tenderness to the inferior aspect. Decreased range of motion. No deformity. No step-off. Neurologic: Left lower extremity motor and sensory intact. Skin: Warm and dry       Physical Exam  DIAGNOSTIC RESULTS     EKG: All EKG's are interpreted by the Emergency Department Physician who either signs or Co-signs this chart in the absence of a cardiologist.    Not indicated unless otherwise documented above or in the midlevel documentation    LABS:  No results found for this visit on 11/10/21. Not indicated unless otherwise documented above or in the midlevel documentation    RADIOLOGY:   I reviewedthe radiologist interpretations:  XR FOOT LEFT (MIN 3 VIEWS)   Final Result   Nondisplaced intra-articular fracture of the big toe distal phalanx. Not indicated unless otherwise documented above or in the midlevel documentation    EMERGENCY DEPARTMENT COURSE:       PERTINENT ATTENDING PHYSICIAN COMMENTS:    Injury earlier today bowling ball fell onto her left big toe. Pain worse with movement. Took Tylenol earlier cannot take Motrin. X-ray. 1:20 PM x-ray demonstrates distal phalanx fracture. Fracture shoe. Discharged to follow-up. Continue Tylenol for pain. Ice as needed.   Return if worsening symptoms or any other concerns    Faculty Attestation    I

## 2021-11-10 NOTE — ED NOTES
Patient to ED via self and  ambulatory to room 7  Patient here for complaint of foot injury  Patient states she was cleaning out her garage when a bowling ball fell down about 4 feet and landed on her left foot which now has discoloration to the big toe and is painful to touch and movement  Patient is able to bare slight weight and is slow to ambulate but otherwise tolerates walking  Denies any other complaints or any other injuries at this time  RR even and non-labored  No needs at this time  Call light remains within reach  Dieter Carrillo NP at bedside to evaluate patient    Patient refused ice pack     Zachary Soler RN  11/10/21 2429

## 2021-11-10 NOTE — Clinical Note
Jose Albetro Dial was seen and treated in our emergency department on 11/10/2021. She may return to work on 11/15/2021. If you have any questions or concerns, please don't hesitate to call.       Jose Mcdaniel, APRN - CNP

## 2021-11-10 NOTE — ED PROVIDER NOTES
[facility]  [facility]  Phone: [phone]        Pt Name: Leora Ramirez  MRN: [id number]  [place] 1975  Date of evaluation: 11/10/21    CHIEFCOMPLAINT       Chief Complaint   Patient presents with    Foot Injury       HISTORY OF PRESENT ILLNESS (Location/Symptom, Timing/Onset, Context/Setting, Quality, Duration, Modifying Factors, Severity)      Leora Ramirez is a 42 y.o. female with no pertinent PMH who presents to the ED via private auto with left big toe pain after dropping a 12 pound bowling ball onto it for about 4 feet when she was cleaning her garage today. She states that she took 650 mg of Tylenol at home with minimal relief of symptoms. She denies hitting her head or any loss conscious during event. She states that the pain is worse when she goes to extend her great toe and denies much increase in pain on palpation. PAST MEDICAL / SURGICAL / SOCIAL / FAMILY HISTORY     PMH:  has a past medical history of Cataracts, bilateral, HX OTHER MEDICAL, Hyperlipidemia, Knee dislocation, Obesity, Osteoarthritis, S/P laparoscopic sleeve gastrectomy, Stress fracture, Tendonitis, Wears glasses, and Wears glasses. Surgical History:  has a past surgical history that includes Tonsillectomy and adenoidectomy;  section (, ); Abdominoplasty (); Ankle fracture surgery; Ankle surgery; Upper gastrointestinal endoscopy; Gastric bypass surgery (14); cyst removal (N/A, 14); and Tubal ligation (). Social History:  reports that she quit smoking about 7 years ago. She smoked 0.00 packs per day. She has never used smokeless tobacco. She reports current alcohol use. She reports that she does not use drugs. Family History: She indicated that the status of her mother is unknown. She indicated that the status of her maternal grandmother is unknown. She indicated that the status of her paternal grandfather is unknown.  She indicated that the status of her maternal aunt is unknown.   family history includes Arthritis in her mother; Asthma in her mother; Cancer in her paternal grandfather; Colon Cancer in her maternal aunt and maternal grandmother; Colon Cancer (age of onset: 50) in her mother; High Cholesterol in her mother. Psychiatric History: None    Allergies: Patient has no known allergies. Home Medications:   Prior to Admission medications    Medication Sig Start Date End Date Taking? Authorizing Provider   methylphenidate (CONCERTA) 54 MG extended release tablet Take 54 mg by mouth every morning. Ortega Davalos Historical Provider, MD   Cholecalciferol (VITAMIN D) 2000 units CAPS capsule Take by mouth    Historical Provider, MD   omeprazole (PRILOSEC) 40 MG delayed release capsule Take 1 capsule by mouth daily 1/4/17   Morgan Parker MD   Multiple Vitamin (MVI, BARIATRIC ADVANTAGE MULTI-FORMULA, CHEW TAB) Take 1 tablet by mouth Indications: OTC chewable [employer] BA capsules with iron, 3 caps daily    Historical Provider, MD   CALCIUM CITRATE, BARIATRIC ADVANTAGE, 500MG LOZENGE Take 1 lozenge by mouth 3 times daily Indications: OTC Chewable     Historical Provider, MD   ValACYclovir HCl (VALTREX PO) Take  by mouth as needed. Historical Provider, MD       REVIEW OF SYSTEMS  (2-9 systems for level 4, 10 ormore for level 5)      Review of Systems   Constitutional: Negative. HENT: Negative. Eyes: Negative. Respiratory: Negative. Cardiovascular: Negative. Gastrointestinal: Negative. Genitourinary: Negative. Musculoskeletal:        Positive toe pain   Skin: Positive for color change. Neurological: Negative. All other systems negative except as marked. PHYSICAL EXAM  (up to 7 for level 4, 8 or more for level 5)      INITIAL VITALS:  height is 5' 7\" (1.702 m) and weight is 102.1 kg (225 lb). Her oral temperature is 98.2 °F (36.8 °C). Her pulse is 73. Her respiration is 16 and oxygen saturation is 98%.       Vital signs reviewed. Physical Exam  Constitutional:       General: She is not in acute distress. HENT:      Head: Normocephalic and atraumatic. Eyes:      Extraocular Movements: Extraocular movements intact. Musculoskeletal:      Cervical back: Neck supple. No rigidity. Right lower leg: Normal.      Left lower leg: Normal.      Right ankle: Normal.      Right Achilles Tendon: Normal.      Left ankle: Normal.      Left Achilles Tendon: Normal.      Right foot: Normal.      Left foot: Normal capillary refill. Bony tenderness present. No crepitus. Normal pulse. Feet:    Feet:      Left foot:      Skin integrity: Skin integrity normal.      Toenail Condition: Left toenails are normal.   Skin:     General: Skin is warm and dry. Capillary Refill: Capillary refill takes less than 2 seconds. Findings: Bruising present. Neurological:      General: No focal deficit present. Mental Status: She is alert. Mental status is at baseline. Psychiatric:         Mood and Affect: Mood normal.         Behavior: Behavior normal.           DIFFERENTIAL DIAGNOSIS / MDM     In my physical exam, plan to obtain an x-ray of the left foot to rule out any fractures. Patient states that she cannot take NSAIDs due to a gastric surgery and did take Tylenol prior to arrival with some relief of symptoms. Patient unable to bear weight but reports increased pain when she pushes off on her left foot. X-ray left foot shows a nondisplaced intra-articular fracture of the big toe distal phalanx. I will provide the patient with a fracture shoe and have her follow-up with the PCP. I encourage patient to Tylenol for her symptoms. Patient is agreement this plan at this time. All question concerns answered at this time. The patient presented with foot pain. A fracture was detected on X-ray. The ankle and knee joints were not affected. No skin lesions were seen. There are no signs of compartment syndrome. The pulses are 2/4. The motor is 5/5. The sensation is intact. The patient was advised to return to the Emergency Department for increasing pain, numbness, weakness, or coldness to the extremity. The patient was further instructed to follow up in 2-3 days with their family doctor or orthopedics. The patient voiced understanding of these instructions. The patient understands that at this time there is no evidence for a more malignant underlying process, but the patient also understands that early in the process of an illness or injury, an emergency department workup can be falsely reassuring. Routine discharge counseling was given, and the patient understands that worsening, changing or persistent symptoms should prompt an immediate call or follow up with their primary physician or return to the emergency department. The importance of appropriate follow up was also discussed. I have reviewed the disposition diagnosis with the patient and or their family/guardian. I have answered their questions and given discharge instructions. They voiced understanding of these instructions and did not have any further questions or complaints. PLAN (LABS / IMAGING / EKG):  Orders Placed This Encounter   Procedures    XR FOOT LEFT (MIN 3 VIEWS)    [employer] Post Op Shoe, Unisex - Left; LG (M9.5-12/F10.5-13)       MEDICATIONS ORDERED:  No orders of the defined types were placed in this encounter. Controlled Substances Monitoring:     DIAGNOSTIC RESULTS     EKG: All EKG's are interpreted by the Emergency Department Physician who either signs or Co-signs this chart in the absenceof a cardiologist.      RADIOLOGY: All images are read by the radiologist and their interpretations are reviewed. XR FOOT LEFT (MIN 3 VIEWS)   Final Result   Nondisplaced intra-articular fracture of the big toe distal phalanx. No results found. LABS:  No results found for this visit on 11/10/21.     EMERGENCY DEPARTMENT COURSE           Vitals:    Vitals:    11/10/21 1241   Pulse: 73   Resp: 16   Temp: 98.2 °F (36.8 °C)   TempSrc: Oral   SpO2: 98%   Weight: 102.1 kg (225 lb)   Height: 5' 7\" (1.702 m)     -------------------------   , Temp: 98.2 °F (36.8 °C), Pulse: 73, Resp: 16      RE-EVALUATION:  See ED Course notes above. CONSULTS:  None    PROCEDURES:  None    FINAL IMPRESSION      1. Closed nondisplaced fracture of distal phalanx of left great toe, initial encounter          DISPOSITION / PLAN     CONDITION ON DISPOSITION:   Good for discharge.      PATIENT REFERRED TO:  Iris Dillon MD  . [facility] [address]  [phone]    Call in 1 day      [facility]  [facility]  [phone]    If symptoms worsen      DISCHARGE MEDICATIONS:  New Prescriptions    No medications on file       ANGIE Johnson - [address] [facility]   Emergency Medicine Nurse Practitioner    (Please note that portions of this note were completed with a voice recognition program.  Efforts were made to edit the dictations but occasionally words aremis-transcribed.)       ANGIE Johnson - CNP  11/10/21 1458

## 2024-12-03 ENCOUNTER — HOSPITAL ENCOUNTER (OUTPATIENT)
Age: 49
Setting detail: SPECIMEN
Discharge: HOME | End: 2024-12-03

## 2024-12-03 ENCOUNTER — OFFICE VISIT (OUTPATIENT)
Dept: OBGYN CLINIC | Age: 49
End: 2024-12-03
Payer: COMMERCIAL

## 2024-12-03 VITALS
WEIGHT: 267.8 LBS | DIASTOLIC BLOOD PRESSURE: 80 MMHG | SYSTOLIC BLOOD PRESSURE: 122 MMHG | BODY MASS INDEX: 42.03 KG/M2 | HEIGHT: 67 IN

## 2024-12-03 DIAGNOSIS — Z12.31 ENCOUNTER FOR SCREENING MAMMOGRAM FOR BREAST CANCER: ICD-10-CM

## 2024-12-03 DIAGNOSIS — Z01.419 WOMEN'S ANNUAL ROUTINE GYNECOLOGICAL EXAMINATION: Primary | ICD-10-CM

## 2024-12-03 DIAGNOSIS — N76.0 ACUTE VAGINITIS: ICD-10-CM

## 2024-12-03 DIAGNOSIS — N89.8 VAGINAL ITCHING: ICD-10-CM

## 2024-12-03 DIAGNOSIS — N81.6 CYSTOCELE WITH RECTOCELE: ICD-10-CM

## 2024-12-03 DIAGNOSIS — N81.10 CYSTOCELE WITH RECTOCELE: ICD-10-CM

## 2024-12-03 PROCEDURE — 1036F TOBACCO NON-USER: CPT | Performed by: NURSE PRACTITIONER

## 2024-12-03 PROCEDURE — 99203 OFFICE O/P NEW LOW 30 MIN: CPT | Performed by: NURSE PRACTITIONER

## 2024-12-03 PROCEDURE — G8484 FLU IMMUNIZE NO ADMIN: HCPCS | Performed by: NURSE PRACTITIONER

## 2024-12-03 PROCEDURE — G8417 CALC BMI ABV UP PARAM F/U: HCPCS | Performed by: NURSE PRACTITIONER

## 2024-12-03 PROCEDURE — G8427 DOCREV CUR MEDS BY ELIG CLIN: HCPCS | Performed by: NURSE PRACTITIONER

## 2024-12-03 RX ORDER — LISDEXAMFETAMINE DIMESYLATE 50 MG/1
50 CAPSULE ORAL EVERY MORNING
COMMUNITY

## 2024-12-03 RX ORDER — PANTOPRAZOLE SODIUM 40 MG/1
40 TABLET, DELAYED RELEASE ORAL
COMMUNITY

## 2024-12-03 RX ORDER — FAMOTIDINE 40 MG/1
40 TABLET, FILM COATED ORAL DAILY
COMMUNITY

## 2024-12-03 ASSESSMENT — ENCOUNTER SYMPTOMS
CONSTIPATION: 1
ABDOMINAL DISTENTION: 0
COUGH: 0
SHORTNESS OF BREATH: 0
RESPIRATORY NEGATIVE: 1
BACK PAIN: 0
ALLERGIC/IMMUNOLOGIC NEGATIVE: 1

## 2024-12-03 NOTE — PROGRESS NOTES
[facility]  [facility]  [address] [place], [place] [address]  [address]  [facility] [address]  Dept: [phone]      12/3/24    Elva Nunn     Gyn Annual Exam      CHIEF COMPLAINT:    Chief Complaint   Patient presents with    [facility]     Last seen Dr Maribel Caballero     Annual Exam     Last pap 19 WNL last mammogram 17 WNL     Vaginal Itching    Uterine Prolapse     Causing incontinence               Blood pressure (!) 142/94, height 1.702 m (5' 7\"), weight 121.5 kg (267 lb 12.8 oz), not currently breastfeeding.           HPI :     Elva Nunn 1975 is a 49 y.o.   who is here for her annual exam.  Postmenopausal since age 42.  She gets hot flashes and night sweats which have improved somewhat  Hx mixed connective tissue  Hx prolapse- worsening bulging and urinary incontinence, difficulty moving her bowels.  Has dx of pelvic floor collapse and has seen a urogyn and was also seen at [facility] and declined surgery  Has done pelvic floor PT x 2  C/o vaginal itching and sores x 6 months.  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  _____________________________________________________________________  Past Medical History:   Diagnosis Date    Cataracts, bilateral     STARTING     HX OTHER MEDICAL     dvt/pe neg    Hyperlipidemia     Knee dislocation     CHRISTIANO AS A CHILD- WAS BRACED WAS 8 MONTHS    Obesity     Osteoarthritis     S/P laparoscopic sleeve gastrectomy 14    wt = 250lb    Stress fracture     rt foot currently    Tendonitis     HX RIGHT ARM    Wears glasses     Wears glasses                                                                    Past Surgical History:   Procedure Laterality Date    ABDOMINOPLASTY      ANKLE FRACTURE SURGERY      LEFT HAD EXTERNAL FIXATOR    ANKLE SURGERY      RIGHT TORN LIGAMENTS     SECTION       SECTION      CYST REMOVAL N/A

## 2024-12-04 LAB
CANDIDA SPECIES: NEGATIVE
GARDNERELLA VAGINALIS: POSITIVE
SOURCE: ABNORMAL
TRICHOMONAS: NEGATIVE

## 2024-12-05 LAB
HPV I/H RISK 4 DNA CVX QL NAA+PROBE: NOT DETECTED
HPV SAMPLE: NORMAL
HPV, INTERPRETATION: NORMAL
HPV16 DNA CVX QL NAA+PROBE: NOT DETECTED
HPV18 DNA CVX QL NAA+PROBE: NOT DETECTED
SPECIMEN DESCRIPTION: NORMAL

## 2024-12-05 RX ORDER — METRONIDAZOLE 500 MG/1
500 TABLET ORAL 2 TIMES DAILY
Qty: 14 TABLET | Refills: 0 | Status: SHIPPED | OUTPATIENT
Start: 2024-12-05 | End: 2024-12-12

## 2024-12-05 NOTE — RESULT ENCOUNTER NOTE
Will you please let her know her swab showed BV and I have sent a rx for oral flagyl.  Please take all of the rx.  Also the hpv was negative, but the pap is still pending

## 2024-12-09 LAB — CYTOLOGY REPORT: NORMAL

## 2024-12-30 DIAGNOSIS — Z12.31 ENCOUNTER FOR SCREENING MAMMOGRAM FOR BREAST CANCER: ICD-10-CM
